# Patient Record
Sex: MALE | Race: WHITE | NOT HISPANIC OR LATINO | Employment: OTHER | URBAN - METROPOLITAN AREA
[De-identification: names, ages, dates, MRNs, and addresses within clinical notes are randomized per-mention and may not be internally consistent; named-entity substitution may affect disease eponyms.]

---

## 2017-06-12 DIAGNOSIS — N40.0 BENIGN PROSTATIC HYPERPLASIA, PRESENCE OF LOWER URINARY TRACT SYMPTOMS UNSPECIFIED, UNSPECIFIED MORPHOLOGY: Primary | ICD-10-CM

## 2017-06-12 RX ORDER — TAMSULOSIN HYDROCHLORIDE 0.4 MG/1
0.4 CAPSULE ORAL DAILY
Qty: 30 CAPSULE | Refills: 11 | Status: CANCELLED | OUTPATIENT
Start: 2017-06-12

## 2017-12-08 ENCOUNTER — OFFICE VISIT (OUTPATIENT)
Dept: FAMILY MEDICINE | Facility: CLINIC | Age: 62
End: 2017-12-08
Payer: MEDICAID

## 2017-12-08 ENCOUNTER — TELEPHONE (OUTPATIENT)
Dept: FAMILY MEDICINE | Facility: CLINIC | Age: 62
End: 2017-12-08

## 2017-12-08 VITALS
HEART RATE: 60 BPM | BODY MASS INDEX: 24.66 KG/M2 | SYSTOLIC BLOOD PRESSURE: 104 MMHG | HEIGHT: 71 IN | TEMPERATURE: 98 F | DIASTOLIC BLOOD PRESSURE: 60 MMHG | WEIGHT: 176.13 LBS

## 2017-12-08 DIAGNOSIS — F41.8 DEPRESSION WITH ANXIETY: Primary | ICD-10-CM

## 2017-12-08 DIAGNOSIS — Z90.5 H/O UNILATERAL NEPHRECTOMY: ICD-10-CM

## 2017-12-08 DIAGNOSIS — K82.4 POLYP OF GALLBLADDER: ICD-10-CM

## 2017-12-08 DIAGNOSIS — Z95.5 HISTORY OF CORONARY ARTERY STENT PLACEMENT: ICD-10-CM

## 2017-12-08 DIAGNOSIS — I25.2 HX OF MYOCARDIAL INFARCTION, GREATER THAN 8 WEEKS: ICD-10-CM

## 2017-12-08 DIAGNOSIS — Z87.19 HISTORY OF LOWER GI BLEEDING: ICD-10-CM

## 2017-12-08 DIAGNOSIS — Z63.4 BEREAVEMENT: ICD-10-CM

## 2017-12-08 DIAGNOSIS — E03.9 HYPOTHYROIDISM, UNSPECIFIED TYPE: ICD-10-CM

## 2017-12-08 DIAGNOSIS — Z72.0 NICOTINE ABUSE: ICD-10-CM

## 2017-12-08 DIAGNOSIS — K63.5 MULTIPLE BENIGN POLYPS OF LARGE INTESTINE: ICD-10-CM

## 2017-12-08 DIAGNOSIS — Z00.00 HEALTHCARE MAINTENANCE: ICD-10-CM

## 2017-12-08 DIAGNOSIS — E78.00 PURE HYPERCHOLESTEROLEMIA: ICD-10-CM

## 2017-12-08 DIAGNOSIS — I10 HYPERTENSION, UNSPECIFIED TYPE: ICD-10-CM

## 2017-12-08 PROCEDURE — 99205 OFFICE O/P NEW HI 60 MIN: CPT | Mod: S$PBB,,, | Performed by: INTERNAL MEDICINE

## 2017-12-08 PROCEDURE — 99999 PR PBB SHADOW E&M-EST. PATIENT-LVL IV: CPT | Mod: PBBFAC,,, | Performed by: INTERNAL MEDICINE

## 2017-12-08 PROCEDURE — 99214 OFFICE O/P EST MOD 30 MIN: CPT | Mod: PBBFAC,PN | Performed by: INTERNAL MEDICINE

## 2017-12-08 RX ORDER — ATORVASTATIN CALCIUM 40 MG/1
40 TABLET, FILM COATED ORAL DAILY
Qty: 30 TABLET | Refills: 1 | Status: SHIPPED | OUTPATIENT
Start: 2017-12-08 | End: 2018-01-22

## 2017-12-08 RX ORDER — FLUOXETINE HYDROCHLORIDE 40 MG/1
40 CAPSULE ORAL DAILY
Qty: 30 CAPSULE | Refills: 1 | Status: SHIPPED | OUTPATIENT
Start: 2017-12-08 | End: 2018-03-21

## 2017-12-08 RX ORDER — LISINOPRIL 5 MG/1
5 TABLET ORAL DAILY
Qty: 30 TABLET | Refills: 1 | Status: SHIPPED | OUTPATIENT
Start: 2017-12-08 | End: 2018-11-14

## 2017-12-08 RX ORDER — ASPIRIN 81 MG/1
1 TABLET ORAL DAILY
COMMUNITY
Start: 2017-11-20 | End: 2018-11-14

## 2017-12-08 RX ORDER — FLUOXETINE HYDROCHLORIDE 40 MG/1
1 CAPSULE ORAL DAILY
COMMUNITY
Start: 2017-11-17 | End: 2017-12-08 | Stop reason: SDUPTHER

## 2017-12-08 RX ORDER — LEVOTHYROXINE SODIUM 100 UG/1
1 TABLET ORAL DAILY
COMMUNITY
Start: 2017-11-17 | End: 2017-12-08 | Stop reason: SDUPTHER

## 2017-12-08 RX ORDER — BUPROPION HYDROCHLORIDE 100 MG/1
100 TABLET, EXTENDED RELEASE ORAL DAILY
Qty: 30 TABLET | Refills: 1 | Status: SHIPPED | OUTPATIENT
Start: 2017-12-08 | End: 2018-01-22

## 2017-12-08 RX ORDER — ACAMPROSATE CALCIUM 333 MG/1
1 TABLET, DELAYED RELEASE ORAL DAILY
COMMUNITY
Start: 2017-11-20 | End: 2018-01-22

## 2017-12-08 RX ORDER — CLOPIDOGREL BISULFATE 75 MG/1
75 TABLET ORAL DAILY
Qty: 30 TABLET | Refills: 1 | Status: SHIPPED | OUTPATIENT
Start: 2017-12-08 | End: 2018-11-14

## 2017-12-08 RX ORDER — LEVOTHYROXINE SODIUM 100 UG/1
100 TABLET ORAL DAILY
Qty: 30 TABLET | Refills: 1 | Status: SHIPPED | OUTPATIENT
Start: 2017-12-08 | End: 2018-01-22

## 2017-12-08 RX ORDER — ATORVASTATIN CALCIUM 40 MG/1
1 TABLET, FILM COATED ORAL DAILY
COMMUNITY
Start: 2017-11-17 | End: 2017-12-08 | Stop reason: SDUPTHER

## 2017-12-08 SDOH — SOCIAL DETERMINANTS OF HEALTH (SDOH): DISSAPEARANCE AND DEATH OF FAMILY MEMBER: Z63.4
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
## 2017-12-08 NOTE — PATIENT INSTRUCTIONS
Depression with anxiety; call his insurance to see which psychiatrist in area he can go to which they cover.     Can also go to [facility] as well for counseling, and assitance. Add wellbutrin 100 mg SR daily  -     Ambulatory consult to Psychology for counseling.    Hypertension, unspecified type. Maintain < 2 Gm Na a day diet, and monitor BP at home; keep a log. Cont lisinopril.  -     CBC auto differential; Future; Expected date: 12/08/2017  -     Comprehensive metabolic panel; Future; Expected date: 12/08/2017  -     Magnesium; Future; Expected date: 12/08/2017  -     Urinalysis; Future; Expected date: 12/08/2017    H/O unilateral nephrectomy; left for RCCA  -     CBC auto differential; Future; Expected date: 12/08/2017  -     Urinalysis; Future; Expected date: 12/08/2017    Pure hypercholesterolemia. Maintain low fat high fiber diet, exercise regularly. Cont atorvastatin  -     Comprehensive metabolic panel; Future; Expected date: 12/08/2017  -     Lipid panel; Future; Expected date: 12/08/2017    History of coronary artery stent placement;   -     Cancel: Ambulatory consult to Cardiology; Dr Cardona.  -     Ambulatory consult to Cardiology    Hypothyroidism, unspecified type; on supplements. TFT's pending  -     TSH; Future; Expected date: 12/08/2017  -     T4, free; Future; Expected date: 12/08/2017  -     T3, free; Future; Expected date: 12/08/2017    Polyp of gallbladder; needs US abd for reassessment.    Nicotine abuse; nicotine patch 21 mg daily; wean as directed. 1-800-QUIT-NOW for counseling, and cost savings.    Healthcare maintenance  -     Hepatitis C antibody; Future; Expected date: 12/08/2017  -     T3, free; Future; Expected date: 12/08/2017    Hx of myocardial infarction, greater than 8 weeks  -     Cancel: Ambulatory consult to Cardiology  -     Ambulatory consult to Cardiology    Bereavement  -     Ambulatory consult to Psychology

## 2017-12-12 ENCOUNTER — LAB VISIT (OUTPATIENT)
Dept: LAB | Facility: HOSPITAL | Age: 62
End: 2017-12-12
Attending: INTERNAL MEDICINE
Payer: MEDICAID

## 2017-12-12 DIAGNOSIS — E03.9 HYPOTHYROIDISM, UNSPECIFIED TYPE: ICD-10-CM

## 2017-12-12 DIAGNOSIS — Z90.5 H/O UNILATERAL NEPHRECTOMY: ICD-10-CM

## 2017-12-12 DIAGNOSIS — E78.00 PURE HYPERCHOLESTEROLEMIA: ICD-10-CM

## 2017-12-12 DIAGNOSIS — Z00.00 HEALTHCARE MAINTENANCE: ICD-10-CM

## 2017-12-12 DIAGNOSIS — I10 HYPERTENSION, UNSPECIFIED TYPE: ICD-10-CM

## 2017-12-12 LAB
ALBUMIN SERPL BCP-MCNC: 3.4 G/DL
ALP SERPL-CCNC: 68 U/L
ALT SERPL W/O P-5'-P-CCNC: 12 U/L
ANION GAP SERPL CALC-SCNC: 6 MMOL/L
AST SERPL-CCNC: 16 U/L
BASOPHILS # BLD AUTO: 0.07 K/UL
BASOPHILS NFR BLD: 1 %
BILIRUB SERPL-MCNC: 0.4 MG/DL
BUN SERPL-MCNC: 11 MG/DL
CALCIUM SERPL-MCNC: 9.3 MG/DL
CHLORIDE SERPL-SCNC: 110 MMOL/L
CHOLEST SERPL-MCNC: 227 MG/DL
CHOLEST/HDLC SERPL: 4.6 {RATIO}
CO2 SERPL-SCNC: 27 MMOL/L
CREAT SERPL-MCNC: 0.9 MG/DL
DIFFERENTIAL METHOD: ABNORMAL
EOSINOPHIL # BLD AUTO: 0.2 K/UL
EOSINOPHIL NFR BLD: 2.2 %
ERYTHROCYTE [DISTWIDTH] IN BLOOD BY AUTOMATED COUNT: 13.7 %
EST. GFR  (AFRICAN AMERICAN): >60 ML/MIN/1.73 M^2
EST. GFR  (NON AFRICAN AMERICAN): >60 ML/MIN/1.73 M^2
GLUCOSE SERPL-MCNC: 81 MG/DL
HCT VFR BLD AUTO: 44.2 %
HDLC SERPL-MCNC: 49 MG/DL
HDLC SERPL: 21.6 %
HGB BLD-MCNC: 14.8 G/DL
IMM GRANULOCYTES # BLD AUTO: 0.01 K/UL
IMM GRANULOCYTES NFR BLD AUTO: 0.1 %
LDLC SERPL CALC-MCNC: 155.4 MG/DL
LYMPHOCYTES # BLD AUTO: 2.5 K/UL
LYMPHOCYTES NFR BLD: 33.7 %
MAGNESIUM SERPL-MCNC: 1.8 MG/DL
MCH RBC QN AUTO: 33.6 PG
MCHC RBC AUTO-ENTMCNC: 33.5 G/DL
MCV RBC AUTO: 100 FL
MONOCYTES # BLD AUTO: 0.6 K/UL
MONOCYTES NFR BLD: 7.9 %
NEUTROPHILS # BLD AUTO: 4 K/UL
NEUTROPHILS NFR BLD: 55.1 %
NONHDLC SERPL-MCNC: 178 MG/DL
NRBC BLD-RTO: 0 /100 WBC
PLATELET # BLD AUTO: 277 K/UL
PMV BLD AUTO: 9.5 FL
POTASSIUM SERPL-SCNC: 5.3 MMOL/L
PROT SERPL-MCNC: 6.8 G/DL
RBC # BLD AUTO: 4.41 M/UL
SODIUM SERPL-SCNC: 143 MMOL/L
T3FREE SERPL-MCNC: 2.2 PG/ML
T4 FREE SERPL-MCNC: 0.8 NG/DL
TRIGL SERPL-MCNC: 113 MG/DL
TSH SERPL DL<=0.005 MIU/L-ACNC: 7.63 UIU/ML
WBC # BLD AUTO: 7.33 K/UL

## 2017-12-12 PROCEDURE — 84443 ASSAY THYROID STIM HORMONE: CPT

## 2017-12-12 PROCEDURE — 84439 ASSAY OF FREE THYROXINE: CPT

## 2017-12-12 PROCEDURE — 83735 ASSAY OF MAGNESIUM: CPT

## 2017-12-12 PROCEDURE — 86803 HEPATITIS C AB TEST: CPT

## 2017-12-12 PROCEDURE — 36415 COLL VENOUS BLD VENIPUNCTURE: CPT | Mod: PN

## 2017-12-12 PROCEDURE — 80053 COMPREHEN METABOLIC PANEL: CPT

## 2017-12-12 PROCEDURE — 84481 FREE ASSAY (FT-3): CPT

## 2017-12-12 PROCEDURE — 80061 LIPID PANEL: CPT

## 2017-12-12 PROCEDURE — 85025 COMPLETE CBC W/AUTO DIFF WBC: CPT

## 2017-12-13 LAB — HCV AB SERPL QL IA: NEGATIVE

## 2017-12-15 ENCOUNTER — TELEPHONE (OUTPATIENT)
Dept: FAMILY MEDICINE | Facility: CLINIC | Age: 62
End: 2017-12-15

## 2017-12-15 NOTE — TELEPHONE ENCOUNTER
----- Message from Thalia Alcantara sent at 12/15/2017  2:48 PM CST -----  Contact: patient  Patient returning a missed call. Please advise. Call to pod. No answer.  Call back   Thanks!

## 2017-12-20 ENCOUNTER — HOSPITAL ENCOUNTER (OUTPATIENT)
Dept: RADIOLOGY | Facility: HOSPITAL | Age: 62
Discharge: HOME | End: 2017-12-20
Attending: INTERNAL MEDICINE
Payer: MEDICAID

## 2017-12-20 DIAGNOSIS — K82.4 POLYP OF GALLBLADDER: ICD-10-CM

## 2017-12-20 PROCEDURE — 76700 US EXAM ABDOM COMPLETE: CPT | Mod: TC,PO

## 2017-12-20 PROCEDURE — 76700 US EXAM ABDOM COMPLETE: CPT | Mod: 26,,, | Performed by: RADIOLOGY

## 2018-01-08 ENCOUNTER — PATIENT OUTREACH (OUTPATIENT)
Dept: ADMINISTRATIVE | Facility: HOSPITAL | Age: 63
End: 2018-01-08

## 2018-01-10 ENCOUNTER — TELEPHONE (OUTPATIENT)
Dept: FAMILY MEDICINE | Facility: CLINIC | Age: 63
End: 2018-01-10

## 2018-01-10 NOTE — TELEPHONE ENCOUNTER
Pt w chest pain and dizziness just earlier today w vision change w getting out car; chest pain apparently for 1 week. Also w palpitations. Pt w CVA/CHF and hx of MI's. Pt seen w EMS arrival; agreeable to going to ER for further evaluation and treatment. Discussed w pt in room w EMS here as well.

## 2018-01-10 NOTE — TELEPHONE ENCOUNTER
Patient with complaints of chest discomfort:  Location:mid chest and left chest  Description:heaviness and squeezing  Pain:4  Duration:week(s) 1 weeek  Radiates to:none  Shortness of breath?:  History of gastric reflux?:  Worse when/aggrevated by:none described by the patient  Gets relief from:none    IF SIGNS AND SYMPTOMS OF HEART ATTACK SEND TO ER!! FIND OUT THE ER AND SEND THE ENTIRE MESSAGE TO MD.    Pt bp is 122/80  p of 60, strong and even.    Pt c/o palpations, chest pain, dizziness, and vision changes.     History of smoking, albrecht, and chronic heart failure along with heart attacks.

## 2018-01-22 ENCOUNTER — OFFICE VISIT (OUTPATIENT)
Dept: FAMILY MEDICINE | Facility: CLINIC | Age: 63
End: 2018-01-22
Payer: MEDICAID

## 2018-01-22 VITALS
SYSTOLIC BLOOD PRESSURE: 120 MMHG | HEART RATE: 60 BPM | TEMPERATURE: 100 F | BODY MASS INDEX: 24.39 KG/M2 | OXYGEN SATURATION: 96 % | WEIGHT: 174.19 LBS | HEIGHT: 71 IN | DIASTOLIC BLOOD PRESSURE: 80 MMHG

## 2018-01-22 DIAGNOSIS — I10 HYPERTENSION, UNSPECIFIED TYPE: ICD-10-CM

## 2018-01-22 DIAGNOSIS — Z72.0 NICOTINE ABUSE: ICD-10-CM

## 2018-01-22 DIAGNOSIS — E03.9 HYPOTHYROIDISM, UNSPECIFIED TYPE: ICD-10-CM

## 2018-01-22 DIAGNOSIS — R42 ORTHOSTATIC DIZZINESS: Primary | ICD-10-CM

## 2018-01-22 DIAGNOSIS — E78.00 PURE HYPERCHOLESTEROLEMIA: ICD-10-CM

## 2018-01-22 DIAGNOSIS — I25.118 CORONARY ARTERY DISEASE OF NATIVE ARTERY OF NATIVE HEART WITH STABLE ANGINA PECTORIS: ICD-10-CM

## 2018-01-22 DIAGNOSIS — F41.8 DEPRESSION WITH ANXIETY: ICD-10-CM

## 2018-01-22 PROCEDURE — 99214 OFFICE O/P EST MOD 30 MIN: CPT | Mod: PBBFAC,PN | Performed by: INTERNAL MEDICINE

## 2018-01-22 PROCEDURE — 99214 OFFICE O/P EST MOD 30 MIN: CPT | Mod: S$PBB,,, | Performed by: INTERNAL MEDICINE

## 2018-01-22 PROCEDURE — 99999 PR PBB SHADOW E&M-EST. PATIENT-LVL IV: CPT | Mod: PBBFAC,,, | Performed by: INTERNAL MEDICINE

## 2018-01-22 RX ORDER — ROSUVASTATIN CALCIUM 40 MG/1
40 TABLET, COATED ORAL NIGHTLY
Qty: 30 TABLET | Refills: 2 | Status: SHIPPED | OUTPATIENT
Start: 2018-01-22 | End: 2018-03-21

## 2018-01-22 RX ORDER — LEVETIRACETAM 500 MG/1
500 TABLET ORAL EVERY 12 HOURS
COMMUNITY
End: 2018-10-21

## 2018-01-22 RX ORDER — MIRTAZAPINE 30 MG/1
TABLET, FILM COATED ORAL
COMMUNITY
Start: 2017-11-20 | End: 2018-01-22

## 2018-01-22 RX ORDER — LISINOPRIL 2.5 MG/1
TABLET ORAL
COMMUNITY
Start: 2017-12-07 | End: 2018-01-22 | Stop reason: SDUPTHER

## 2018-01-22 RX ORDER — VIT C/E/ZN/COPPR/LUTEIN/ZEAXAN 250MG-90MG
2 CAPSULE ORAL DAILY
COMMUNITY
End: 2018-11-14

## 2018-01-22 RX ORDER — FOLIC ACID 0.4 MG
800 TABLET ORAL DAILY
COMMUNITY
End: 2018-11-14

## 2018-01-22 RX ORDER — DOCUSATE SODIUM 100 MG/1
100 CAPSULE, LIQUID FILLED ORAL 2 TIMES DAILY
COMMUNITY
End: 2018-10-21

## 2018-01-22 RX ORDER — MULTIVITAMIN
1 TABLET ORAL DAILY
COMMUNITY
End: 2018-11-14

## 2018-01-22 RX ORDER — LEVOTHYROXINE SODIUM 112 UG/1
112 TABLET ORAL DAILY
Qty: 30 TABLET | Refills: 2 | Status: SHIPPED | OUTPATIENT
Start: 2018-01-22 | End: 2018-03-21

## 2018-01-22 RX ORDER — FLUOXETINE HYDROCHLORIDE 20 MG/1
CAPSULE ORAL
COMMUNITY
Start: 2018-01-19 | End: 2018-01-22 | Stop reason: SDUPTHER

## 2018-01-22 RX ORDER — IBUPROFEN 200 MG
TABLET ORAL
COMMUNITY
Start: 2017-11-20 | End: 2018-01-22 | Stop reason: CLARIF

## 2018-01-22 NOTE — PATIENT INSTRUCTIONS
Orthostatic dizziness; seems to have resolved w decrease coffee and increasing his fluids. Keep well hydrated.    Hypertension, unspecified type; Maintain < 2 Gm Na a day diet, and monitor BP at home; keep a log. Cont lisinopril.    Hypothyroidism, unspecified type; increase dose of levothyroxine to 112 mcg daily.    Coronary artery disease of native artery of native heart with stable angina pectoris; ambulatory cardiology consult to dr Rider to get established.    Pure hypercholesterolemia. Maintain low fat high fiber diet, exercise regularly. Change atorvastatin to rosuvastatin 40 mg a day.    Depression with anxiety stable on present meds; continue present tx.    Nicotine abuse; knows he needs to quit; has nicotrol inhalers to use; wean off as tolerated by 10-14 days;      1-800-QUIT-NOW for counseling and support.
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
## 2018-01-26 ENCOUNTER — LAB VISIT (OUTPATIENT)
Dept: LAB | Facility: HOSPITAL | Age: 63
End: 2018-01-26
Attending: INTERNAL MEDICINE
Payer: MEDICAID

## 2018-01-26 DIAGNOSIS — I25.118 CORONARY ARTERY DISEASE OF NATIVE ARTERY OF NATIVE HEART WITH STABLE ANGINA PECTORIS: ICD-10-CM

## 2018-01-26 DIAGNOSIS — E03.9 HYPOTHYROIDISM, UNSPECIFIED TYPE: ICD-10-CM

## 2018-01-26 DIAGNOSIS — E78.00 PURE HYPERCHOLESTEROLEMIA: ICD-10-CM

## 2018-01-26 DIAGNOSIS — I10 HYPERTENSION, UNSPECIFIED TYPE: ICD-10-CM

## 2018-01-26 DIAGNOSIS — F41.8 DEPRESSION WITH ANXIETY: ICD-10-CM

## 2018-01-26 LAB
ALBUMIN SERPL BCP-MCNC: 3.4 G/DL
ALP SERPL-CCNC: 77 U/L
ALT SERPL W/O P-5'-P-CCNC: 10 U/L
ANION GAP SERPL CALC-SCNC: 6 MMOL/L
AST SERPL-CCNC: 16 U/L
BILIRUB SERPL-MCNC: 0.4 MG/DL
BUN SERPL-MCNC: 8 MG/DL
CALCIUM SERPL-MCNC: 9.3 MG/DL
CHLORIDE SERPL-SCNC: 105 MMOL/L
CHOLEST SERPL-MCNC: 188 MG/DL
CHOLEST/HDLC SERPL: 4.3 {RATIO}
CO2 SERPL-SCNC: 30 MMOL/L
CREAT SERPL-MCNC: 1 MG/DL
EST. GFR  (AFRICAN AMERICAN): >60 ML/MIN/1.73 M^2
EST. GFR  (NON AFRICAN AMERICAN): >60 ML/MIN/1.73 M^2
GLUCOSE SERPL-MCNC: 105 MG/DL
HDLC SERPL-MCNC: 44 MG/DL
HDLC SERPL: 23.4 %
LDLC SERPL CALC-MCNC: 104.4 MG/DL
MAGNESIUM SERPL-MCNC: 1.8 MG/DL
NONHDLC SERPL-MCNC: 144 MG/DL
POTASSIUM SERPL-SCNC: 4.5 MMOL/L
PROT SERPL-MCNC: 6.8 G/DL
SODIUM SERPL-SCNC: 141 MMOL/L
T3FREE SERPL-MCNC: 2.4 PG/ML
T4 FREE SERPL-MCNC: 0.8 NG/DL
TRIGL SERPL-MCNC: 198 MG/DL
TSH SERPL DL<=0.005 MIU/L-ACNC: 16.11 UIU/ML

## 2018-01-26 PROCEDURE — 80053 COMPREHEN METABOLIC PANEL: CPT

## 2018-01-26 PROCEDURE — 84481 FREE ASSAY (FT-3): CPT

## 2018-01-26 PROCEDURE — 36415 COLL VENOUS BLD VENIPUNCTURE: CPT | Mod: PN

## 2018-01-26 PROCEDURE — 80061 LIPID PANEL: CPT

## 2018-01-26 PROCEDURE — 84443 ASSAY THYROID STIM HORMONE: CPT

## 2018-01-26 PROCEDURE — 83735 ASSAY OF MAGNESIUM: CPT

## 2018-01-26 PROCEDURE — 84439 ASSAY OF FREE THYROXINE: CPT

## 2018-03-07 ENCOUNTER — PATIENT OUTREACH (OUTPATIENT)
Dept: ADMINISTRATIVE | Facility: HOSPITAL | Age: 63
End: 2018-03-07

## 2018-03-07 NOTE — LETTER
March 7, 2018    Aneudy Johnson   [address]  [place] [address]             [facility]  [address]  [facility] [address]  Phone: [phone] Dear Mr. Johnson:    [facility] is committed to your overall health.  To help you get the most out of each of your visits, we will review your information to make sure you are up to date on all of your recommended tests and/or procedures.      Dr. Raoul Greer has found that your chart shows you may be due for colon cancer screening and possibly some immunizations (tetanus, flu, and pneumonia).     Medicare does not cover all immunizations to be given in the clinic.  Check your benefits to ensure that you do not need to receive your immunizations at the pharmacy.    If you have had any of the above done at another facility, please bring the records or information with you so that your record at [facility] will be complete.  If you would like to schedule any of these, please contact me.    If you are currently taking medication, please bring it with you to your appointment for review.    If you have any questions or concerns, please don't hesitate to call.    Thank you for letting us care for you,  Melanie Dick LPN Clinical Care Coordinator  [facility] [place]  [phone]

## 2018-03-21 ENCOUNTER — OFFICE VISIT (OUTPATIENT)
Dept: FAMILY MEDICINE | Facility: CLINIC | Age: 63
End: 2018-03-21
Payer: MEDICAID

## 2018-03-21 ENCOUNTER — TELEPHONE (OUTPATIENT)
Dept: FAMILY MEDICINE | Facility: CLINIC | Age: 63
End: 2018-03-21

## 2018-03-21 VITALS
BODY MASS INDEX: 24.57 KG/M2 | TEMPERATURE: 98 F | HEART RATE: 60 BPM | OXYGEN SATURATION: 97 % | SYSTOLIC BLOOD PRESSURE: 130 MMHG | HEIGHT: 71 IN | WEIGHT: 175.5 LBS | DIASTOLIC BLOOD PRESSURE: 80 MMHG

## 2018-03-21 DIAGNOSIS — Z95.5 S/P CORONARY ARTERY STENT PLACEMENT: ICD-10-CM

## 2018-03-21 DIAGNOSIS — L85.3 DRY SKIN DERMATITIS: ICD-10-CM

## 2018-03-21 DIAGNOSIS — R42 ORTHOSTATIC DIZZINESS: ICD-10-CM

## 2018-03-21 DIAGNOSIS — I10 ESSENTIAL HYPERTENSION: Primary | ICD-10-CM

## 2018-03-21 DIAGNOSIS — E03.9 HYPOTHYROIDISM, UNSPECIFIED TYPE: ICD-10-CM

## 2018-03-21 DIAGNOSIS — E78.2 MIXED HYPERLIPIDEMIA: ICD-10-CM

## 2018-03-21 DIAGNOSIS — R60.0 BILATERAL LEG EDEMA: ICD-10-CM

## 2018-03-21 DIAGNOSIS — B35.3 TINEA PEDIS, UNSPECIFIED LATERALITY: ICD-10-CM

## 2018-03-21 DIAGNOSIS — R73.01 IMPAIRED FASTING GLUCOSE: ICD-10-CM

## 2018-03-21 DIAGNOSIS — I25.118 CORONARY ARTERY DISEASE OF NATIVE ARTERY OF NATIVE HEART WITH STABLE ANGINA PECTORIS: ICD-10-CM

## 2018-03-21 DIAGNOSIS — F41.8 DEPRESSION WITH ANXIETY: ICD-10-CM

## 2018-03-21 DIAGNOSIS — Z72.0 NICOTINE ABUSE: ICD-10-CM

## 2018-03-21 PROCEDURE — 99214 OFFICE O/P EST MOD 30 MIN: CPT | Mod: PBBFAC,PN | Performed by: INTERNAL MEDICINE

## 2018-03-21 PROCEDURE — 99999 PR PBB SHADOW E&M-EST. PATIENT-LVL IV: CPT | Mod: PBBFAC,,, | Performed by: INTERNAL MEDICINE

## 2018-03-21 PROCEDURE — 99215 OFFICE O/P EST HI 40 MIN: CPT | Mod: S$PBB,,, | Performed by: INTERNAL MEDICINE

## 2018-03-21 RX ORDER — LEVOTHYROXINE SODIUM 150 UG/1
150 TABLET ORAL DAILY
COMMUNITY
End: 2018-11-14

## 2018-03-21 RX ORDER — GUAIFENESIN/DEXTROMETHORPHAN 100-5 MG/5
1 LIQUID (ML) ORAL 2 TIMES DAILY
Qty: 130 G | Refills: 1 | COMMUNITY
Start: 2018-03-21 | End: 2018-11-14

## 2018-03-21 RX ORDER — ATORVASTATIN CALCIUM 40 MG/1
40 TABLET, FILM COATED ORAL DAILY
COMMUNITY
Start: 2017-11-17 | End: 2018-11-14

## 2018-03-21 RX ORDER — AMMONIUM LACTATE 12 G/100G
LOTION TOPICAL 2 TIMES DAILY PRN
Qty: 225 G | Refills: 1 | Status: SHIPPED | OUTPATIENT
Start: 2018-03-21 | End: 2018-05-03 | Stop reason: SDUPTHER

## 2018-03-21 RX ORDER — FLUOXETINE HYDROCHLORIDE 20 MG/1
20 CAPSULE ORAL DAILY
Qty: 60 CAPSULE | Refills: 2 | Status: SHIPPED | OUTPATIENT
Start: 2018-03-21 | End: 2018-04-16 | Stop reason: SDUPTHER

## 2018-03-21 RX ORDER — FLUOXETINE HYDROCHLORIDE 40 MG/1
40 CAPSULE ORAL DAILY
Qty: 30 CAPSULE | Refills: 1 | Status: CANCELLED | OUTPATIENT
Start: 2018-03-21

## 2018-03-21 NOTE — PATIENT INSTRUCTIONS
Essential hypertension. Maintain < 2 Gm Na a day diet, and monitor BP at home; keep a log. Cont lisinopril    Orthostatic dizziness; has cleared w stopping heavy caffeine intake; and increased fluid intake.    Hypothyroidism, unspecified type; instructed on proper intake; has been taking 150 mcg a day w cereal and vitamins.       Repeat TFT in 6 weeks w f/u.    Mixed HLP: need to increase atorvastatin to 80 mg a day. Maintain low fat high fiber diet, exercise regularly.    Coronary artery disease of native artery of native heart with stable angina pectoris; has been stable; Awaing apt w new cardiologist to get established. Hx of 4 coronary stents.    Depression with anxiety; stable on prozac 20 mg BID; renewed for pt.   -     FLUoxetine (PROZAC) 20 MG capsule; Take 1 capsule (20 mg total) by mouth once daily.  Dispense: 60 capsule; Refill: 2    Impaired fasting glucose.Exercise recommended with weight reduction and low carb diet; we'll follow hemoglobin A1c's with you periodically.    Tinea pedis; use lotrimin powdered spray between his toes, and on affected areas of his feet, 2x a day for up to 2 weeks;  Dry skin dermatitis; use ammonium topical 2x a day as needed, and to calluses 1st toes as well.    [place] Lower leg edema; Maintain less than 2 g sodium diet; elevate lower extremities at home; use compression stockings if possible.    Other orders  -     Cancel: FLUoxetine (PROZAC) 40 MG capsule; Take 1 capsule (40 mg total) by mouth once daily.  Dispense: 30 capsule; Refill: 1

## 2018-03-21 NOTE — PROGRESS NOTES
Subjective:       Patient ID: Aneudy Johnson Jr. is a 63 y.o. male.    Chief Complaint: Follow-up (labs)    HPI  Pt has improved a lot w regards to dizzy spells; have cleared; was drinking coffee like water; has stopped coffee; 1.5 pots a day or more, now down to 1 cup a day. HTN at home w 130/80 avr; watches his salt intake. CAD: no chest pain, SOB, or palp. Card apt pending still. No longer living in a van; living w a friend now. Depression w anxiety doing better. On prozac 20 mg BID. Nicotine abuse: down to 1/2 PPD. On nicotrol. Wife passed away 11/2017; together 20 yrs. Caregiver for 3 yrs straight. Pt has been taking his levothyroxine w food and other meds on review; he's actually been taking 150 mcg from Dr Moser his prior PCP filled in 12/2017 w 5 refills. He takes it w his cereal and vitamins. Instructed on proper intake w med. Will repeat TFT after 6 weeks. No constipation, brittle nails, or fatigue. Hasn't seen his prior PCP, Dr Jayro Moser, in almost 2 yrs. Have asked pt not to accept any other scripts from his prior PCP Dr Moser. Pt hasn't seen him in almost 2 yrs.  Extensive time spent getting his meds straight as has been receiving meds under old PCP's direction.    Total time:: [id number]; >50% time spent on discussion, review, and counseling. Meds required checking w his pharmacist at [facility] as well; he recently changed from [facility]. PMH/surgical hx were noted along w [facility] as well. ROS obtained at length prior to physical performed.    Review of Systems   Constitutional: Negative for appetite change and unexpected weight change.   HENT: Negative for congestion, postnasal drip, rhinorrhea and sinus pressure.         Denies seasonal allergies, or perennial allergies   Eyes: Negative for discharge and itching.   Respiratory: Negative for cough, chest tightness, shortness of breath and wheezing.    Cardiovascular: Positive for leg swelling. Negative for chest pain and palpitations.       "  Over 2 mos w living in [place]; cleared w lying down in house. Stayed in [place] for 4 add days and returned.   Gastrointestinal: Negative for abdominal pain, blood in stool, constipation, diarrhea, nausea and vomiting.   Endocrine: Negative for polydipsia, polyphagia and polyuria.   Genitourinary: Negative for dysuria and hematuria.   Musculoskeletal: Negative for arthralgias and myalgias.   Skin: Negative for rash.   Allergic/Immunologic: Negative for environmental allergies and food allergies.   Neurological: Negative for tremors, seizures and headaches.   Hematological: Negative for adenopathy. Does not bruise/bleed easily.   Psychiatric/Behavioral:         Improved anxiety / depression.       Objective:      Vitals:    03/21/18 1101   BP: 130/80   BP Location: Right arm   Patient Position: Sitting   BP Method: Medium (Manual)   Pulse: 60   Temp: 98 °F (36.7 °C)   TempSrc: Oral   SpO2: 97%   Weight: 79.6 kg (175 lb 7.8 oz)   Height: 5' 11" (1.803 m)     Body mass index is 24.48 kg/m².    Physical Exam   Constitutional: He is oriented to person, place, and time. He appears well-developed and well-nourished.   HENT:   Head: Normocephalic and atraumatic.   Eyes: EOM are normal.   Neck: Normal range of motion. Neck supple. No thyromegaly present.   R carotid surgery scar. No bruits heard.   Cardiovascular: Normal rate, regular rhythm and normal heart sounds.  Exam reveals no gallop.    No murmur heard.  Pulmonary/Chest: Effort normal and breath sounds normal. No respiratory distress. He has no wheezes. He has no rales.   Abdominal: Soft. Bowel sounds are normal. He exhibits no distension. There is no tenderness. There is no rebound and no guarding.   Musculoskeletal: Normal range of motion. He exhibits no edema.   1-2+ pratima LE edema; no calf tenderness to palp.   Lymphadenopathy:     He has no cervical adenopathy.   Neurological: He is alert and oriented to person, place, and time.   Moves all 4 extremities fine.   Skin: " No rash noted.   1-2+ pratima LE edema; no calf tenderness to palp. Athlete's feet noted between toes and around varoius borders of his foot around heels and ankle    Psychiatric: He has a normal mood and affect. His behavior is normal. Thought content normal.   Vitals reviewed.      Assessment:       1. Essential hypertension    2. Orthostatic dizziness    3. Hypothyroidism, unspecified type    4. Mixed hyperlipidemia    5. Coronary artery disease of native artery of native heart with stable angina pectoris    6. S/P coronary artery stent placement    7. Depression with anxiety    8. Impaired fasting glucose    9. Tinea pedis, unspecified laterality    10. Dry skin dermatitis    11. Bilateral leg edema    12. Nicotine abuse        Plan:       Essential hypertension. Maintain < 2 Gm Na a day diet, and monitor BP at home; keep a log. Cont lisinopril    Orthostatic dizziness; has cleared w stopping heavy caffeine intake; and increased fluid intake.    Hypothyroidism, unspecified type; instructed on proper intake; has been taking 150 mcg a day w cereal and vitamins.       Repeat TFT in 6 weeks w f/u.    Mixed HLP: need to increase atorvastatin to 80 mg a day. Maintain low fat high fiber diet, exercise regularly.    Coronary artery disease of native artery of native heart with stable angina pectoris; has been stable; awaing apt w new cardiologist to get established. Hx of 4 coronary stents.    Depression with anxiety; stable on prozac 20 mg BID; renewed for pt.   -     FLUoxetine (PROZAC) 20 MG capsule; Take 1 capsule (20 mg total) by mouth once daily.  Dispense: 60 capsule; Refill: 2    Impaired fasting glucose.Exercise recommended with weight reduction and low carb diet; we'll follow hemoglobin A1c's with you periodically.    Tinea pedis; use lotrimin powdered spray between his toes, and on affected areas of his feet, 2x a day for up to 2 weeks;    Dry skin dermatitis; use ammonium topical 2x a day as needed, and to  calluses 1st toes as well.    [place] Lower leg edema; Maintain less than 2 g sodium diet; elevate lower extremities at home; use compression stockings if possible.    Nicotine abuse; knows he needs to quit; down to 1/2 PPD; wants to continue nicotrol attempts.     Other orders  -     Cancel: FLUoxetine (PROZAC) 40 MG capsule; Take 1 capsule (40 mg total) by mouth once daily.  Dispense: 30 capsule; Refill: 1

## 2018-03-21 NOTE — TELEPHONE ENCOUNTER
----- Message from Nahed Johnson sent at 3/21/2018 12:15 PM CDT -----  Contact: Tereza with [facility]  Tereza with [facility] calling back to verify directions for FLUoxetine. Please call Tereza at [phone]. Thanks!

## 2018-03-22 NOTE — TELEPHONE ENCOUNTER
Please call pharmacy notify them that the fluoxetine is supposed to be at 20 mg by mouth twice a day; that is the correct dose for which the patient's been taking for some time now; apparently [facility] had  sent the wrong dose at 40 mg by mouth daily to his prior PCP Dr. Jayro Eid, who ok'd a script for 40 mg a day before he started seeing me

## 2018-03-22 NOTE — TELEPHONE ENCOUNTER
Spoke with Pasha with [facility] and informed her that Fluoxetine is supposed to be 20 mg 1 tablet twice a day.     Pasha verbally understands.

## 2018-04-02 ENCOUNTER — TELEPHONE (OUTPATIENT)
Dept: FAMILY MEDICINE | Facility: CLINIC | Age: 63
End: 2018-04-02

## 2018-04-02 NOTE — TELEPHONE ENCOUNTER
Attempted to contact pt schedule 1 week follow up with labs.     No answer; left message to return call.

## 2018-04-02 NOTE — TELEPHONE ENCOUNTER
Please ask pt to schedule preventive medicine evaluation for update next few weeks. Also needs f/u to go over his labs. Please try to obtain copy of old records from his PCP Dr Moser as well. Please also ask him last time he had an abdominal US to evaluate his gallbladder polyp.

## 2018-04-16 DIAGNOSIS — F41.8 DEPRESSION WITH ANXIETY: ICD-10-CM

## 2018-04-16 RX ORDER — FLUOXETINE HYDROCHLORIDE 20 MG/1
20 CAPSULE ORAL DAILY
Qty: 90 CAPSULE | Refills: 0 | Status: SHIPPED | OUTPATIENT
Start: 2018-04-16 | End: 2018-10-21

## 2018-05-03 DIAGNOSIS — L85.3 DRY SKIN DERMATITIS: ICD-10-CM

## 2018-05-03 RX ORDER — AMMONIUM LACTATE 12 G/100G
LOTION TOPICAL 2 TIMES DAILY PRN
Qty: 225 G | Refills: 1 | Status: SHIPPED | OUTPATIENT
Start: 2018-05-03 | End: 2018-11-14

## 2018-05-28 ENCOUNTER — LAB VISIT (OUTPATIENT)
Dept: LAB | Facility: HOSPITAL | Age: 63
End: 2018-05-28
Attending: INTERNAL MEDICINE
Payer: MEDICAID

## 2018-05-28 DIAGNOSIS — R42 ORTHOSTATIC DIZZINESS: ICD-10-CM

## 2018-05-28 DIAGNOSIS — R73.01 IMPAIRED FASTING GLUCOSE: ICD-10-CM

## 2018-05-28 DIAGNOSIS — E03.9 HYPOTHYROIDISM, UNSPECIFIED TYPE: ICD-10-CM

## 2018-05-28 DIAGNOSIS — E78.2 MIXED HYPERLIPIDEMIA: ICD-10-CM

## 2018-05-28 DIAGNOSIS — I10 ESSENTIAL HYPERTENSION: ICD-10-CM

## 2018-05-28 LAB
ALBUMIN SERPL BCP-MCNC: 3.6 G/DL
ALP SERPL-CCNC: 79 U/L
ALT SERPL W/O P-5'-P-CCNC: 9 U/L
ANION GAP SERPL CALC-SCNC: 6 MMOL/L
AST SERPL-CCNC: 14 U/L
BILIRUB SERPL-MCNC: 0.5 MG/DL
BUN SERPL-MCNC: 7 MG/DL
CALCIUM SERPL-MCNC: 9.2 MG/DL
CHLORIDE SERPL-SCNC: 104 MMOL/L
CO2 SERPL-SCNC: 26 MMOL/L
CREAT SERPL-MCNC: 0.8 MG/DL
EST. GFR  (AFRICAN AMERICAN): >60 ML/MIN/1.73 M^2
EST. GFR  (NON AFRICAN AMERICAN): >60 ML/MIN/1.73 M^2
ESTIMATED AVG GLUCOSE: 105 MG/DL
GLUCOSE SERPL-MCNC: 82 MG/DL
HBA1C MFR BLD HPLC: 5.3 %
POTASSIUM SERPL-SCNC: 4.3 MMOL/L
PROT SERPL-MCNC: 6.8 G/DL
SODIUM SERPL-SCNC: 136 MMOL/L
T3FREE SERPL-MCNC: 2.4 PG/ML
T4 FREE SERPL-MCNC: 1.12 NG/DL
TSH SERPL DL<=0.005 MIU/L-ACNC: 5.91 UIU/ML

## 2018-05-28 PROCEDURE — 83036 HEMOGLOBIN GLYCOSYLATED A1C: CPT

## 2018-05-28 PROCEDURE — 84481 FREE ASSAY (FT-3): CPT

## 2018-05-28 PROCEDURE — 84439 ASSAY OF FREE THYROXINE: CPT

## 2018-05-28 PROCEDURE — 36415 COLL VENOUS BLD VENIPUNCTURE: CPT | Mod: PN

## 2018-05-28 PROCEDURE — 80053 COMPREHEN METABOLIC PANEL: CPT

## 2018-05-28 PROCEDURE — 84443 ASSAY THYROID STIM HORMONE: CPT

## 2018-10-21 ENCOUNTER — HOSPITAL ENCOUNTER (EMERGENCY)
Facility: HOSPITAL | Age: 63
Discharge: HOME | End: 2018-10-21
Attending: EMERGENCY MEDICINE
Payer: MEDICAID

## 2018-10-21 VITALS
OXYGEN SATURATION: 99 % | DIASTOLIC BLOOD PRESSURE: 80 MMHG | HEIGHT: 71 IN | RESPIRATION RATE: 20 BRPM | HEART RATE: 87 BPM | SYSTOLIC BLOOD PRESSURE: 127 MMHG | TEMPERATURE: 98 F | WEIGHT: 175 LBS | BODY MASS INDEX: 24.5 KG/M2

## 2018-10-21 DIAGNOSIS — N43.40 SPERMATOCELE: ICD-10-CM

## 2018-10-21 DIAGNOSIS — R10.9 FLANK PAIN: ICD-10-CM

## 2018-10-21 DIAGNOSIS — N43.3 HYDROCELE, UNSPECIFIED HYDROCELE TYPE: Primary | ICD-10-CM

## 2018-10-21 LAB
ANION GAP SERPL CALC-SCNC: 8 MMOL/L
BASOPHILS # BLD AUTO: 0.07 K/UL
BASOPHILS NFR BLD: 0.8 %
BILIRUB UR QL STRIP: NEGATIVE
BUN SERPL-MCNC: 9 MG/DL
CALCIUM SERPL-MCNC: 9.3 MG/DL
CHLORIDE SERPL-SCNC: 103 MMOL/L
CLARITY UR REFRACT.AUTO: ABNORMAL
CO2 SERPL-SCNC: 26 MMOL/L
COLOR UR AUTO: YELLOW
CREAT SERPL-MCNC: 0.9 MG/DL
DIFFERENTIAL METHOD: ABNORMAL
EOSINOPHIL # BLD AUTO: 0.2 K/UL
EOSINOPHIL NFR BLD: 1.7 %
ERYTHROCYTE [DISTWIDTH] IN BLOOD BY AUTOMATED COUNT: 12.1 %
EST. GFR  (AFRICAN AMERICAN): >60 ML/MIN/1.73 M^2
EST. GFR  (NON AFRICAN AMERICAN): >60 ML/MIN/1.73 M^2
GLUCOSE SERPL-MCNC: 81 MG/DL
GLUCOSE UR QL STRIP: NEGATIVE
HCT VFR BLD AUTO: 46.3 %
HGB BLD-MCNC: 15.7 G/DL
HGB UR QL STRIP: NEGATIVE
IMM GRANULOCYTES # BLD AUTO: 0.02 K/UL
IMM GRANULOCYTES NFR BLD AUTO: 0.2 %
KETONES UR QL STRIP: NEGATIVE
LEUKOCYTE ESTERASE UR QL STRIP: NEGATIVE
LYMPHOCYTES # BLD AUTO: 3.1 K/UL
LYMPHOCYTES NFR BLD: 34.7 %
MCH RBC QN AUTO: 33 PG
MCHC RBC AUTO-ENTMCNC: 33.9 G/DL
MCV RBC AUTO: 97 FL
MONOCYTES # BLD AUTO: 0.9 K/UL
MONOCYTES NFR BLD: 10.2 %
NEUTROPHILS # BLD AUTO: 4.7 K/UL
NEUTROPHILS NFR BLD: 52.4 %
NITRITE UR QL STRIP: NEGATIVE
NRBC BLD-RTO: 0 /100 WBC
PH UR STRIP: 7 [PH] (ref 5–8)
PLATELET # BLD AUTO: 272 K/UL
PMV BLD AUTO: 9.4 FL
POTASSIUM SERPL-SCNC: 4.3 MMOL/L
PROT UR QL STRIP: NEGATIVE
RBC # BLD AUTO: 4.76 M/UL
SODIUM SERPL-SCNC: 137 MMOL/L
SP GR UR STRIP: 1.01 (ref 1–1.03)
URN SPEC COLLECT METH UR: ABNORMAL
UROBILINOGEN UR STRIP-ACNC: 2 EU/DL
WBC # BLD AUTO: 9.03 K/UL

## 2018-10-21 PROCEDURE — 25000003 PHARM REV CODE 250: Performed by: PHYSICIAN ASSISTANT

## 2018-10-21 PROCEDURE — 80048 BASIC METABOLIC PNL TOTAL CA: CPT

## 2018-10-21 PROCEDURE — 81003 URINALYSIS AUTO W/O SCOPE: CPT

## 2018-10-21 PROCEDURE — 85025 COMPLETE CBC W/AUTO DIFF WBC: CPT

## 2018-10-21 PROCEDURE — 99285 EMERGENCY DEPT VISIT HI MDM: CPT | Mod: 25

## 2018-10-21 PROCEDURE — 99284 EMERGENCY DEPT VISIT MOD MDM: CPT | Mod: ,,, | Performed by: PHYSICIAN ASSISTANT

## 2018-10-21 RX ORDER — LIDOCAINE 50 MG/G
1 PATCH TOPICAL
Status: DISCONTINUED | OUTPATIENT
Start: 2018-10-21 | End: 2018-10-21 | Stop reason: HOSPADM

## 2018-10-21 RX ADMIN — LIDOCAINE 1 PATCH: 50 PATCH TOPICAL at 03:10

## 2018-10-21 NOTE — ED TRIAGE NOTES
Presents to ER with complaint of a dull but stabbing pain to his right low back that radiates down his buttock and pain in his groin area along with swelling for 2 days.  Patient's name and date of birth checked and is correct.    LOC: The patient is awake, alert, and oriented to place, time, situation. Affect is appropriate.  Speech is appropriate and clear.      APPEARANCE: Patient resting comfortably, reporting palpation, light headedness,  in no acute distress.  Patient is clean and well groomed.     SKIN: The skin is warm and dry; color consistent with ethnicity.  Patient has normal skin turgor and moist mucus membranes.  Skin intact; no breakdown or bruising noted.      MUSCULOSKELETAL: Patient moving upper and lower extremities without difficulty.  Denies weakness.      RESPIRATORY: Airway is open and patent. Respirations spontaneous, even, easy, and non-labored.  Patient has a normal effort and rate.  No accessory muscle use noted. Denies cough.  BS clear.     CARDIAC:  No peripheral edema noted. No complaints of chest pain.       ABDOMEN: Soft and non tender to palpation.  No distention noted.      NEUROLOGIC: Eyes open spontaneously.  Behavior appropriate to situation.  Follows commands; facial expression symmetrical.  Purposeful motor response noted; normal sensation in all extremities.

## 2018-10-21 NOTE — ED PROVIDER NOTES
Encounter Date: 10/21/2018       History     Chief Complaint   Patient presents with    Flank Pain     right flank pain on and off x 1 week, getting worse. hx of kidney ca. had left kidney removed 5 years ago.      Pt. Is a 64 yo male with a past medical history of renal cell carcinoma, s/p Left nephrectomy, who presents to the ED with complaints of right sided flank pain and discomfort x 1 week which has been intermittent.  Patient was the pain as a 6/10 in severity and describes the pain as colicky and stabbing like.  He states the pain is nonradiating.  He has had a fall approximately 5 months ago while he was digging a ditch and ended up up with his legs split apart at that time.  He states he had some scrotal pain at that point, but did not seek medical attention. He states that the scrotal pain has progressed and worsened lately.  He denies any fever or dysuria.           Review of patient's allergies indicates:   Allergen Reactions    Penicillins      Past Medical History:   Diagnosis Date    Alcoholism     Cancer     Kidney L nephrectomy    Coronary artery disease     Hyperlipidemia     Hypertension     Hypothyroidism     MI (myocardial infarction)     Polyp of gallbladder     Dr Eid following w yearly US of [place].    PTSD (post-traumatic stress disorder)     Seizures     Stroke     Thyroid disease     hypothyroidism     Past Surgical History:   Procedure Laterality Date    COLONOSCOPY  2007    benign polyps; Dr Hester in [place], [place]    CORONARY ANGIOPLASTY WITH STENT PLACEMENT      x4; .    HERNIA REPAIR      R inguinal; in [place].    KNEE ARTHROPLASTY      NEPHRECTOMY      Left; for [facility];  Dr Casillas; [facility]    SALIVARY GLAND SURGERY       Family History   Problem Relation Age of Onset    Arthritis Mother     Early death Mother          at 64 of CHF    Heart disease Mother         CHF;  at 64    Asthma Father     COPD Father     Cancer Father          colon cancer    Hyperlipidemia Father     Hypertension Father     Cancer Paternal Uncle         colon cancer.    Diabetes Maternal Grandmother     Stroke Paternal Grandmother      Social History     Tobacco Use    Smoking status: Current Every Day Smoker     Packs/day: 1.00     Years: 35.00     Pack years: 35.00     Types: Cigarettes     Start date: 1975    Smokeless tobacco: Never Used   Substance Use Topics    Alcohol use: Yes     Alcohol/week: 14.4 oz     Types: 24 Cans of beer per week     Comment: Hx of alcoholism. States drinking binge last week. Last etoh drink 4 days ago    Drug use: No     Review of Systems   Constitutional: Negative for fever.   HENT: Negative for sore throat.    Eyes: Negative for pain and redness.   Respiratory: Negative for shortness of breath.    Cardiovascular: Negative for chest pain.   Gastrointestinal: Negative for nausea.   Endocrine: Negative for heat intolerance, polydipsia, polyphagia and polyuria.   Genitourinary: Positive for decreased urine volume, flank pain, scrotal swelling, testicular pain and urgency. Negative for difficulty urinating, discharge, dysuria, enuresis, frequency, genital sores, hematuria, penile pain and penile swelling.   Musculoskeletal: Negative for back pain.   Skin: Negative for rash.   Neurological: Negative for dizziness, seizures, facial asymmetry, speech difficulty and weakness.   Hematological: Does not bruise/bleed easily.   Psychiatric/Behavioral: Negative for agitation, behavioral problems, confusion and decreased concentration.       Physical Exam     Initial Vitals [10/21/18 1354]   BP Pulse Resp Temp SpO2   127/80 87 20 97.8 °F (36.6 °C) 99 %      MAP       --         Physical Exam    Constitutional: He appears well-developed and well-nourished.   HENT:   Head: Normocephalic and atraumatic.   Right Ear: External ear normal.   Left Ear: External ear normal.   Nose: Nose normal.   Mouth/Throat: Oropharynx is clear and moist.   Eyes:  Conjunctivae and EOM are normal. Pupils are equal, round, and reactive to light.   Neck: Normal range of motion. Neck supple.   Cardiovascular: Normal rate, regular rhythm, normal heart sounds and intact distal pulses.   Pulmonary/Chest: Breath sounds normal.   Abdominal: Soft. Bowel sounds are normal. He exhibits no distension, no pulsatile liver, no fluid wave, no abdominal bruit, no ascites, no pulsatile midline mass and no mass. There is no hepatosplenomegaly. There is no rigidity, no rebound, no guarding and no CVA tenderness. No hernia. Hernia confirmed negative in the right inguinal area and confirmed negative in the left inguinal area.   Genitourinary: Penis normal. Cremasteric reflex is present. Right testis shows swelling and tenderness. Right testis shows no mass. Right testis is descended. Cremasteric reflex is not absent on the right side. Left testis shows no mass, no swelling and no tenderness. Left testis is descended. Cremasteric reflex is not absent on the left side. Circumcised. No phimosis, paraphimosis, hypospadias, penile erythema or penile tenderness. No discharge found.   Musculoskeletal: Normal range of motion.   Neurological: He is alert and oriented to person, place, and time. He has normal strength and normal reflexes. GCS score is 15. GCS eye subscore is 4. GCS verbal subscore is 5. GCS motor subscore is 6.   Skin: Skin is warm and dry. Capillary refill takes less than 2 seconds.   Psychiatric: He has a normal mood and affect. His behavior is normal. Judgment and thought content normal.         ED Course   Procedures  Labs Reviewed - No data to display       Imaging Results    None       Imaging Results          CT Renal Stone Study ABD Pelvis WO (Final result)  Result time 10/21/18 16:43:51    Final result by Juan Carlos Cervantes MD (10/21/18 16:43:51)                 Impression:      1. No findings to suggest obstructive uropathy, noting postsurgical changes of left nephrectomy.  2.  There is slow flow through a few small bowel loops, finding overall is nonspecific.  3. Additional findings above.      Electronically signed by: Juan Carlos Cervantes MD  Date:    10/21/2018  Time:    16:43             Narrative:    EXAMINATION:  CT RENAL STONE STUDY ABD PELVIS WO    CLINICAL HISTORY:  Flank pain, stone disease suspected;    TECHNIQUE:  Low dose axial images, sagittal and coronal reformations were obtained from the lung bases to the pubic symphysis.  Contrast was not administered.    COMPARISON:  None    FINDINGS:  Images of the lower thorax are remarkable for bilateral dependent atelectasis, noting there may be fibrotic change involving the inferior aspect of the right upper lobe and inferolateral right lower lobe.    The liver, pancreas, gallbladder and adrenal glands are grossly unremarkable.  There are calcified granulomas within the spleen.  There is no biliary dilation or ascites.  No significant abdominal lymphadenopathy.    The left kidney is surgically absent, no significant abnormality within the nephrectomy bed.  The right kidney has a grossly unremarkable noncontrast appearance without hydronephrosis or nephrolithiasis.  The right ureter is unremarkable without calculi seen.  The urinary bladder is unremarkable.  The prostate is not enlarged, noting calcification.    The large bowel is grossly unremarkable, allowing for post if rectum E configuration.  The terminal ileum is unremarkable.  The appendix is unremarkable.  There is slow flow through a few proximal small bowel loops.  No focal organized pelvic fluid collection.  There is atherosclerotic calcification of the aorta and its branches.  There are bilateral fat containing inguinal hernias.    Degenerative changes are noted of the spine.  There are a few scattered sclerotic lesions in the region of the sacroiliac joints, nonspecific, correlation with PSA however is recommended.  No significant inguinal lymphadenopathy.  Postsurgical  changes noted of the                               US Scrotum And Testicles (Final result)  Result time 10/21/18 15:55:40    Final result by Brian Asher MD (10/21/18 15:55:40)                 Impression:      Large left spermatocele vs loculated hydrocele, grossly similar to the 04/20/2018 exam.    Electronically signed by resident: Kevin Monk  Date:    10/21/2018  Time:    15:32    Electronically signed by: Brian Asher MD  Date:    10/21/2018  Time:    15:55             Narrative:    EXAMINATION:  US SCROTUM AND TESTICLES    CLINICAL HISTORY:  Unspecified abdominal pain.  Patient was taking and did 5 months ago, fell and did the splits.    TECHNIQUE:  Sonography of the scrotum and testes.    COMPARISON:  Scrotal ultrasound 04/20/2018.    FINDINGS:  Right Testicle:    Size: The 4.4 x 3.4 x 2.1 cm    Appearance: Normal    Flow: Normal arterial and venous flow    Epididymis: Epididymal head cyst measures 0.7 x 0.9 x 1.0 cm.    Hydrocele: None    Varicocele: None    Left Testicle:    Size: 4.7 x 3.8 x 1.9 cm    Appearance: Normal    Flow: Normal arterial and venous flow    Epididymis: Epididymal head cyst measures 1.7 x 1.6 x 1.2 cm.    Hydrocele: Cystic structure with mobile internal echoes measures 5.8 x 5.5 x 2.3 cm.    Varicocele: None                                 Medical Decision Making:   History:   Old Medical Records: I decided to obtain old medical records.  Old Records Summarized: records from clinic visits.  Initial Assessment:   Hydrocele   Differential Diagnosis:   Varicocele   Clinical Tests:   Lab Tests: Ordered and Reviewed  Radiological Study: Ordered and Reviewed  Medical Tests: Ordered and Reviewed  ED Management:  Pt. Is a 64 yo male with a past medical history of renal cell carcinoma, s/p Left nephrectomy, who presents to the ED with complaints of right sided flank pain and discomfort x 1 week which has been intermittent.  Ultrasound of testicles performed today which demonstrates a large  left spermatocele vs. loculated hydrocele.  We will refer the patient to Urology for further workup with.  CT scan of the abdomen and pelvis nonenhanced demonstrates no obstructive uropathy.               APC / Resident Notes:   Pt. Is a 62 yo male with a past medical history of renal cell carcinoma, s/p Left nephrectomy, who presents to the ED with complaints of right sided flank pain and discomfort x 1 week which has been intermittent.  Patient was the pain as a 6/10 in severity and describes the pain as colicky and stabbing like.  He states the pain is nonradiating.  He has had a fall approximately 5 months ago while he was digging a ditch and ended up up with his legs split apart at that time.  He states he had some scrotal pain at that point, but did not seek medical attention. He states that the scrotal pain has progressed and worsened lately.  He denies any fever or dysuria.  Ultrasound of the scrotum and testicles demonstrates a large mass spermatocele versus loculated hydrocele.  The patient will be referred to Urology for further workup.           Attending Attestation:     Physician Attestation Statement for NP/PA:       Other NP/PA Attestation Additions:    History of Present Illness: Discussed with midlevel provider.  Patient seen and examined by me.  Patient with right lower back pain for 3 days.  Atraumatic. He has a history of kidney cancer with nephrectomy several years ago.  Motor strength sensation intact distally.  No saddle anesthesia.  2+ pulses all extremities.  EHL equal intact bilateral lower extremity.  The patient has no rash.  No abdominal pain or tenderness. Normal pulses in all extremities.  He is also complaining of at least a couple of weeks of scrotal swelling and pain.  He has no urinary symptoms.  Labs appear unremarkable. No signs or symptoms of urinary tract infection.  CT scan and ultrasound pending will be followed up by a mid-level provider.                      Clinical  Impression:   The primary encounter diagnosis was Hydrocele, unspecified hydrocele type. Diagnoses of Flank pain and Spermatocele were also pertinent to this visit.      Disposition:   Disposition: Discharged  Condition: Stable                        Marcial Santos PA-C  10/21/18 4633